# Patient Record
Sex: FEMALE | Race: WHITE | Employment: FULL TIME | URBAN - METROPOLITAN AREA
[De-identification: names, ages, dates, MRNs, and addresses within clinical notes are randomized per-mention and may not be internally consistent; named-entity substitution may affect disease eponyms.]

---

## 2017-02-28 ENCOUNTER — TELEPHONE (OUTPATIENT)
Dept: BARIATRICS/WEIGHT MGMT | Age: 62
End: 2017-02-28

## 2017-03-06 ENCOUNTER — OFFICE VISIT (OUTPATIENT)
Dept: BARIATRICS/WEIGHT MGMT | Age: 62
End: 2017-03-06

## 2017-03-06 VITALS
DIASTOLIC BLOOD PRESSURE: 88 MMHG | RESPIRATION RATE: 16 BRPM | SYSTOLIC BLOOD PRESSURE: 140 MMHG | WEIGHT: 239.5 LBS | HEIGHT: 62 IN | HEART RATE: 76 BPM | BODY MASS INDEX: 44.07 KG/M2

## 2017-03-06 DIAGNOSIS — K21.9 GASTROESOPHAGEAL REFLUX DISEASE WITHOUT ESOPHAGITIS: ICD-10-CM

## 2017-03-06 DIAGNOSIS — E66.01 MORBID OBESITY WITH BMI OF 40.0-44.9, ADULT (HCC): Primary | ICD-10-CM

## 2017-03-06 PROCEDURE — 99203 OFFICE O/P NEW LOW 30 MIN: CPT | Performed by: FAMILY MEDICINE

## 2017-03-06 RX ORDER — M-VIT,TX,IRON,MINS/CALC/FOLIC 27MG-0.4MG
1 TABLET ORAL DAILY
COMMUNITY

## 2017-03-06 RX ORDER — OMEPRAZOLE 40 MG/1
40 CAPSULE, DELAYED RELEASE ORAL DAILY
COMMUNITY

## 2017-03-06 RX ORDER — BUPROPION HYDROCHLORIDE 300 MG/1
300 TABLET ORAL DAILY
COMMUNITY
Start: 2016-12-22

## 2017-03-06 RX ORDER — ERGOCALCIFEROL 1.25 MG/1
CAPSULE ORAL
COMMUNITY
Start: 2017-01-23

## 2017-03-06 RX ORDER — DULAGLUTIDE 0.75 MG/.5ML
INJECTION, SOLUTION SUBCUTANEOUS WEEKLY
COMMUNITY
Start: 2017-02-20 | End: 2018-07-10

## 2017-03-06 RX ORDER — LEVOMEFOLATE/ALGAL OIL 15-90.314
CAPSULE ORAL
COMMUNITY
Start: 2017-01-12

## 2017-03-06 RX ORDER — AMLODIPINE BESYLATE 5 MG/1
TABLET ORAL
COMMUNITY
Start: 2017-01-16

## 2017-03-06 RX ORDER — DESVENLAFAXINE SUCCINATE 100 MG/1
100 TABLET, EXTENDED RELEASE ORAL DAILY
COMMUNITY
Start: 2016-12-22

## 2017-03-06 RX ORDER — ERYTHROMYCIN 250 MG/1
TABLET, DELAYED RELEASE ORAL
COMMUNITY
Start: 2017-01-27

## 2017-03-06 RX ORDER — GABAPENTIN 600 MG/1
900 TABLET ORAL DAILY
COMMUNITY
Start: 2017-02-20 | End: 2017-03-06

## 2017-03-06 ASSESSMENT — ENCOUNTER SYMPTOMS
EYES NEGATIVE: 1
GASTROINTESTINAL NEGATIVE: 1
RESPIRATORY NEGATIVE: 1

## 2017-03-20 ENCOUNTER — TELEPHONE (OUTPATIENT)
Dept: BARIATRICS/WEIGHT MGMT | Age: 62
End: 2017-03-20

## 2017-03-20 DIAGNOSIS — E78.2 MIXED HYPERLIPIDEMIA: Primary | ICD-10-CM

## 2017-03-25 DIAGNOSIS — E66.01 MORBID OBESITY WITH BMI OF 40.0-44.9, ADULT (HCC): ICD-10-CM

## 2017-03-25 LAB
A/G RATIO: 1.5 (ref 1.1–2.2)
ALBUMIN SERPL-MCNC: 3.9 G/DL (ref 3.4–5)
ALP BLD-CCNC: 75 U/L (ref 40–129)
ALT SERPL-CCNC: 28 U/L (ref 10–40)
ANION GAP SERPL CALCULATED.3IONS-SCNC: 14 MMOL/L (ref 3–16)
AST SERPL-CCNC: 18 U/L (ref 15–37)
BILIRUB SERPL-MCNC: 0.5 MG/DL (ref 0–1)
BUN BLDV-MCNC: 17 MG/DL (ref 7–20)
CALCIUM SERPL-MCNC: 9.5 MG/DL (ref 8.3–10.6)
CHLORIDE BLD-SCNC: 104 MMOL/L (ref 99–110)
CO2: 25 MMOL/L (ref 21–32)
CREAT SERPL-MCNC: 0.6 MG/DL (ref 0.6–1.2)
GFR AFRICAN AMERICAN: >60
GFR NON-AFRICAN AMERICAN: >60
GLOBULIN: 2.6 G/DL
GLUCOSE BLD-MCNC: 123 MG/DL (ref 70–99)
POTASSIUM SERPL-SCNC: 4.7 MMOL/L (ref 3.5–5.1)
SODIUM BLD-SCNC: 143 MMOL/L (ref 136–145)
TOTAL PROTEIN: 6.5 G/DL (ref 6.4–8.2)

## 2017-03-26 LAB
ESTIMATED AVERAGE GLUCOSE: 119.8 MG/DL
HBA1C MFR BLD: 5.8 %

## 2017-03-27 ENCOUNTER — TELEPHONE (OUTPATIENT)
Dept: BARIATRICS/WEIGHT MGMT | Age: 62
End: 2017-03-27

## 2017-03-27 DIAGNOSIS — R73.03 PREDIABETES: Primary | ICD-10-CM

## 2017-03-30 ENCOUNTER — OFFICE VISIT (OUTPATIENT)
Dept: BARIATRICS/WEIGHT MGMT | Age: 62
End: 2017-03-30

## 2017-03-30 VITALS
HEIGHT: 62 IN | WEIGHT: 229.5 LBS | HEART RATE: 88 BPM | SYSTOLIC BLOOD PRESSURE: 136 MMHG | DIASTOLIC BLOOD PRESSURE: 82 MMHG | BODY MASS INDEX: 42.23 KG/M2

## 2017-03-30 DIAGNOSIS — E78.2 MIXED HYPERLIPIDEMIA: ICD-10-CM

## 2017-03-30 DIAGNOSIS — R73.03 PREDIABETES: ICD-10-CM

## 2017-03-30 DIAGNOSIS — E66.01 MORBID OBESITY WITH BMI OF 40.0-44.9, ADULT (HCC): Primary | ICD-10-CM

## 2017-03-30 PROCEDURE — 99213 OFFICE O/P EST LOW 20 MIN: CPT | Performed by: FAMILY MEDICINE

## 2017-03-30 ASSESSMENT — ENCOUNTER SYMPTOMS
GASTROINTESTINAL NEGATIVE: 1
RESPIRATORY NEGATIVE: 1
EYES NEGATIVE: 1

## 2017-05-01 ENCOUNTER — OFFICE VISIT (OUTPATIENT)
Dept: BARIATRICS/WEIGHT MGMT | Age: 62
End: 2017-05-01

## 2017-05-01 VITALS
SYSTOLIC BLOOD PRESSURE: 122 MMHG | DIASTOLIC BLOOD PRESSURE: 80 MMHG | WEIGHT: 221 LBS | HEART RATE: 64 BPM | BODY MASS INDEX: 40.67 KG/M2 | HEIGHT: 62 IN

## 2017-05-01 DIAGNOSIS — E66.01 MORBID OBESITY WITH BMI OF 40.0-44.9, ADULT (HCC): Primary | ICD-10-CM

## 2017-05-01 DIAGNOSIS — Z71.3 DIETARY COUNSELING AND SURVEILLANCE: ICD-10-CM

## 2017-05-01 PROCEDURE — 99213 OFFICE O/P EST LOW 20 MIN: CPT | Performed by: FAMILY MEDICINE

## 2017-05-01 RX ORDER — NAPROXEN 250 MG/1
250 TABLET ORAL 2 TIMES DAILY WITH MEALS
COMMUNITY
End: 2018-07-10

## 2017-05-01 ASSESSMENT — ENCOUNTER SYMPTOMS
EYES NEGATIVE: 1
GASTROINTESTINAL NEGATIVE: 1
RESPIRATORY NEGATIVE: 1

## 2017-06-13 ENCOUNTER — OFFICE VISIT (OUTPATIENT)
Dept: BARIATRICS/WEIGHT MGMT | Age: 62
End: 2017-06-13

## 2017-06-13 VITALS
SYSTOLIC BLOOD PRESSURE: 112 MMHG | HEART RATE: 72 BPM | HEIGHT: 62 IN | DIASTOLIC BLOOD PRESSURE: 70 MMHG | BODY MASS INDEX: 39.01 KG/M2 | WEIGHT: 212 LBS

## 2017-06-13 DIAGNOSIS — E66.9 CLASS 2 OBESITY: Primary | ICD-10-CM

## 2017-06-13 PROCEDURE — 99213 OFFICE O/P EST LOW 20 MIN: CPT | Performed by: FAMILY MEDICINE

## 2017-06-13 ASSESSMENT — ENCOUNTER SYMPTOMS
GASTROINTESTINAL NEGATIVE: 1
EYES NEGATIVE: 1
RESPIRATORY NEGATIVE: 1

## 2017-06-16 LAB
HPV COMMENT: NORMAL
HPV TYPE 16: NOT DETECTED
HPV TYPE 18: NOT DETECTED
HPVOH (OTHER TYPES): NOT DETECTED

## 2017-06-21 ENCOUNTER — HOSPITAL ENCOUNTER (OUTPATIENT)
Dept: ENDOSCOPY | Age: 62
Discharge: HOME | End: 2017-06-21
Attending: INTERNAL MEDICINE | Admitting: INTERNAL MEDICINE

## 2017-06-21 VITALS
TEMPERATURE: 98.6 F | BODY MASS INDEX: 38.28 KG/M2 | HEIGHT: 62 IN | SYSTOLIC BLOOD PRESSURE: 132 MMHG | RESPIRATION RATE: 16 BRPM | WEIGHT: 208 LBS | DIASTOLIC BLOOD PRESSURE: 69 MMHG | HEART RATE: 70 BPM | OXYGEN SATURATION: 96 %

## 2017-06-21 LAB
GLUCOSE BLD-MCNC: 127 MG/DL (ref 70–99)
PERFORMED ON: ABNORMAL

## 2017-06-21 RX ORDER — FROVATRIPTAN SUCCINATE 2.5 MG/1
2.5 TABLET, FILM COATED ORAL PRN
COMMUNITY

## 2017-07-19 ENCOUNTER — OFFICE VISIT (OUTPATIENT)
Dept: BARIATRICS/WEIGHT MGMT | Age: 62
End: 2017-07-19

## 2017-07-19 VITALS
HEART RATE: 64 BPM | WEIGHT: 206.5 LBS | DIASTOLIC BLOOD PRESSURE: 84 MMHG | HEIGHT: 62 IN | BODY MASS INDEX: 38 KG/M2 | SYSTOLIC BLOOD PRESSURE: 128 MMHG

## 2017-07-19 DIAGNOSIS — Z71.3 DIETARY COUNSELING AND SURVEILLANCE: ICD-10-CM

## 2017-07-19 DIAGNOSIS — E66.9 CLASS 2 OBESITY: Primary | ICD-10-CM

## 2017-07-19 DIAGNOSIS — F32.A DEPRESSION, UNSPECIFIED DEPRESSION TYPE: ICD-10-CM

## 2017-07-19 DIAGNOSIS — I10 ESSENTIAL HYPERTENSION: ICD-10-CM

## 2017-07-19 PROCEDURE — 99214 OFFICE O/P EST MOD 30 MIN: CPT | Performed by: FAMILY MEDICINE

## 2017-07-19 ASSESSMENT — ENCOUNTER SYMPTOMS
EYES NEGATIVE: 1
RESPIRATORY NEGATIVE: 1
GASTROINTESTINAL NEGATIVE: 1

## 2017-08-14 ENCOUNTER — OFFICE VISIT (OUTPATIENT)
Dept: BARIATRICS/WEIGHT MGMT | Age: 62
End: 2017-08-14

## 2017-08-14 VITALS
BODY MASS INDEX: 37.81 KG/M2 | DIASTOLIC BLOOD PRESSURE: 82 MMHG | WEIGHT: 205.5 LBS | SYSTOLIC BLOOD PRESSURE: 132 MMHG | HEIGHT: 62 IN | HEART RATE: 72 BPM

## 2017-08-14 DIAGNOSIS — E66.9 CLASS 2 OBESITY: Primary | ICD-10-CM

## 2017-08-14 DIAGNOSIS — Z71.3 DIETARY COUNSELING AND SURVEILLANCE: ICD-10-CM

## 2017-08-14 PROCEDURE — 99213 OFFICE O/P EST LOW 20 MIN: CPT | Performed by: FAMILY MEDICINE

## 2017-08-14 ASSESSMENT — ENCOUNTER SYMPTOMS
GASTROINTESTINAL NEGATIVE: 1
RESPIRATORY NEGATIVE: 1
EYES NEGATIVE: 1

## 2017-09-18 ENCOUNTER — OFFICE VISIT (OUTPATIENT)
Dept: BARIATRICS/WEIGHT MGMT | Age: 62
End: 2017-09-18

## 2017-09-18 VITALS
SYSTOLIC BLOOD PRESSURE: 130 MMHG | WEIGHT: 200 LBS | DIASTOLIC BLOOD PRESSURE: 80 MMHG | HEIGHT: 62 IN | BODY MASS INDEX: 36.8 KG/M2 | HEART RATE: 64 BPM

## 2017-09-18 DIAGNOSIS — E66.9 CLASS 2 OBESITY: Primary | ICD-10-CM

## 2017-09-18 DIAGNOSIS — Z71.3 DIETARY COUNSELING AND SURVEILLANCE: ICD-10-CM

## 2017-09-18 PROCEDURE — 99213 OFFICE O/P EST LOW 20 MIN: CPT | Performed by: FAMILY MEDICINE

## 2017-09-19 ENCOUNTER — TELEPHONE (OUTPATIENT)
Dept: BARIATRICS/WEIGHT MGMT | Age: 62
End: 2017-09-19

## 2017-09-19 ASSESSMENT — ENCOUNTER SYMPTOMS
GASTROINTESTINAL NEGATIVE: 1
RESPIRATORY NEGATIVE: 1
EYES NEGATIVE: 1

## 2017-10-23 ENCOUNTER — OFFICE VISIT (OUTPATIENT)
Dept: BARIATRICS/WEIGHT MGMT | Age: 62
End: 2017-10-23

## 2017-10-23 VITALS
BODY MASS INDEX: 36.53 KG/M2 | WEIGHT: 198.5 LBS | DIASTOLIC BLOOD PRESSURE: 80 MMHG | HEIGHT: 62 IN | SYSTOLIC BLOOD PRESSURE: 136 MMHG | HEART RATE: 60 BPM

## 2017-10-23 DIAGNOSIS — E66.9 CLASS 2 OBESITY: Primary | ICD-10-CM

## 2017-10-23 DIAGNOSIS — Z71.3 DIETARY COUNSELING AND SURVEILLANCE: ICD-10-CM

## 2017-10-23 PROCEDURE — 99213 OFFICE O/P EST LOW 20 MIN: CPT | Performed by: FAMILY MEDICINE

## 2017-10-23 NOTE — PROGRESS NOTES
Bhumi CALIXTO Cheng lost 1.5 lbs over past month. She is using 3 products/day     Current treatment plan:   Patient is not on medication. Negative side effects from medications? N/A  Patient is  on Optifast.   Patient is not on diet and exercise only plan. 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 Consuming at least 64oz of calorie free fluids? Yes    Participating in intentional exercise?  Reports her pain level interferes- doing acupuncture     Plan/Goals: increase exercise to 3 x week     Handouts: none     Miguel Murdock

## 2017-10-23 NOTE — PATIENT INSTRUCTIONS
changes in your eating habits. Instead of a diet, focus on lifestyle changes that will improve your health and achieve the right balance of energy and calories. To lose weight, you need to burn more calories than you take in. You can do it by eating healthy foods in reasonable amounts and becoming more active, even a little bit every day. Making small changes over time can add up to a lot. Make a plan for change. Many people have found that naming their reasons for change and staying focused on their plan can make a big difference. Work with your doctor to create a plan that is right for you. · Ask yourself: Renny Sanchez are my personal, most powerful reasons for wanting this change? What will my life look like when I've made the change? \"  · Set your long-term goal. Make it specific, such as \"I will lose x pounds. \"  · Break your long-term goal into smaller, short-term goals. Make these small steps specific and within your reach, things you know you can do. These steps are what keep you going from day to day. How can you stay on your plan for change? Be ready. Choose to start during a time when there are few events that might trigger slip-ups, like holidays, social events, and high-stress periods. Decide on your first few steps. Most people have more success when they make small changes, one step at a time. For example, you might switch a daily candy bar to a piece of fruit, walk 10 minutes more, or add more vegetables to a meal.  Line up your support people. Make sure you're not going to be alone as you make this change. Connect with people who understand how important it is to you. Ask family members and friends for help in keeping with your plan. And think about who could make it harder for you, and how to handle them. Try tracking. People who keep track of what they eat, feel, and do are better at losing weight. Try writing down things like:  · What and how much you eat.   · How you feel before and after each

## 2017-10-24 ASSESSMENT — ENCOUNTER SYMPTOMS
RESPIRATORY NEGATIVE: 1
EYES NEGATIVE: 1
GASTROINTESTINAL NEGATIVE: 1

## 2017-10-24 NOTE — PROGRESS NOTES
Patient: Greg Leonard                      Encounter Date: 10/23/2017    YOB: 1955               Age: 65 y.o. Chief Complaint   Patient presents with    Weight Management     8th MWM, Optifast       /80   Pulse 60   Ht 5' 1.5\" (1.562 m)   Wt 198 lb 8 oz (90 kg)   BMI 36.90 kg/m²     Body mass index is 36.9 kg/m². Waist Circumference  Waist (Inches): 44 in    HPI:  65 y.o. female with a long-standing history of obesity presents today for follow-up. She has lost 1.5 pound since her last visit on 9/19 . Current treatment includes Optifast 1200-calorie plan with 1 HP MR and 2 regular MR diet. Met with the dietitian today. Assessment reviewed. Overall, making good dietary choices and sticking to the meal plan. She has been stressed due to 's recent cancer diagnosis. She's trying her best to stay focused and not stress eat. Diet: []LCHF/Ketogenic   []Modified low-calorie/low carb diet  [x]Low-calorie diet          []Maintenance       []Other:         Adherent?  [x]Yes     []No       Side effects: No          Exercise: []Cardio     []Resistance/strength training     [x]Other: No intentional exercise, but trying to be physically active     Allergies   Allergen Reactions    Seasonal          Current Outpatient Prescriptions:     frovatriptan succinate (FROVA) 2.5 MG TABS, Take 2.5 mg by mouth as needed for Migraine, Disp: , Rfl:     MELOXICAM PO, Take by mouth daily, Disp: , Rfl:     Cholecalciferol (VITAMIN D PO), Take by mouth daily, Disp: , Rfl:     DOXYCYCLINE PO, Take by mouth daily, Disp: , Rfl:     vitamin B-2 (RIBOFLAVIN) 100 MG TABS tablet, TK 4 TS PO QD, Disp: , Rfl: 11    naproxen (NAPROSYN) 250 MG tablet, Take 250 mg by mouth 2 times daily (with meals), Disp: , Rfl:     amLODIPine (NORVASC) 5 MG tablet, , Disp: , Rfl:     buPROPion (WELLBUTRIN XL) 300 MG extended release tablet, Take 300 mg by mouth daily, Disp: , Rfl:     PRISTIQ 100 MG TB24 extended release tablet, Take 100 mg by mouth daily, Disp: , Rfl:     MIRANDA- MG EC tablet, , Disp: , Rfl:     MAGNESIUM-OXIDE 400 (241.3 MG) MG TABS tablet, TK 1 T PO D, Disp: , Rfl: 0    vitamin D (ERGOCALCIFEROL) 70062 UNITS CAPS capsule, , Disp: , Rfl:     TRULICITY 1.98 KW/6.2VZ SOPN, Inject into the skin once a week , Disp: , Rfl:     L-Methylfolate-Algae (DEPLIN 15) 15-90.314 MG CAPS, , Disp: , Rfl:     Fluticasone Propionate (FLONASE NA), by Nasal route, Disp: , Rfl:     omeprazole (PRILOSEC) 40 MG delayed release capsule, Take 40 mg by mouth daily, Disp: , Rfl:     Multiple Vitamins-Minerals (THERAPEUTIC MULTIVITAMIN-MINERALS) tablet, Take 1 tablet by mouth daily, Disp: , Rfl:     aspirin 81 MG tablet, Take 81 mg by mouth daily, Disp: , Rfl:     albuterol (PROVENTIL HFA;VENTOLIN HFA) 108 (90 BASE) MCG/ACT inhaler, Inhale 2 puffs into the lungs every 6 hours as needed. , Disp: , Rfl:     gabapentin (NEURONTIN) 300 MG capsule, Take 600 mg by mouth 3 times daily. , Disp: , Rfl:     metoprolol (TOPROL XL) 100 MG XL tablet, Take 100 mg by mouth daily. , Disp: , Rfl:     progesterone (PROMETRIUM) 100 MG capsule, Take 100 mg by mouth daily. , Disp: , Rfl:     estradiol (VIVELLE) 0.05 MG/24HR, Place 1 patch onto the skin Twice a Week , Disp: , Rfl:     LORazepam (ATIVAN) 0.5 MG tablet, Take 0.5 mg by mouth every 6 hours as needed. , Disp: , Rfl:     Patient Active Problem List   Diagnosis    Obesity    Osteoarthritis    Hyperlipidemia    Hypertension    Sleep apnea    Asthma    GERD (gastroesophageal reflux disease)    Mixed hyperlipidemia    Prediabetes       Review of Systems   HENT: Negative. Eyes: Negative. Respiratory: Negative. Cardiovascular: Negative. Gastrointestinal: Negative. Endocrine: Negative. Musculoskeletal: Negative. Neurological: Negative. Psychiatric/Behavioral: Negative. Physical Exam   Constitutional: She is oriented to person, place, and time.  She appears well-developed and well-nourished. Cardiovascular: Normal rate, regular rhythm and normal heart sounds. Exam reveals no gallop and no friction rub. No murmur heard. Pulmonary/Chest: Effort normal and breath sounds normal. No respiratory distress. She has no wheezes. She has no rales. Musculoskeletal: She exhibits no edema. Neurological: She is alert and oriented to person, place, and time. Skin: Skin is warm and dry. Psychiatric: She has a normal mood and affect. Her behavior is normal. Judgment and thought content normal.       Admission on 09/19/2017, Discharged on 09/19/2017   Component Date Value Ref Range Status    WBC 09/19/2017 12.2* 4.0 - 11.0 K/uL Final    RBC 09/19/2017 5.09  4.00 - 5.20 M/uL Final    Hemoglobin 09/19/2017 15.0  12.0 - 16.0 g/dL Final    Hematocrit 09/19/2017 44.3  36.0 - 48.0 % Final    MCV 09/19/2017 87.0  80.0 - 100.0 fL Final    MCH 09/19/2017 29.4  26.0 - 34.0 pg Final    MCHC 09/19/2017 33.7  31.0 - 36.0 g/dL Final    RDW 09/19/2017 13.8  12.4 - 15.4 % Final    Platelets 27/08/7478 284  135 - 450 K/uL Final    MPV 09/19/2017 8.0  5.0 - 10.5 fL Final    Sodium 09/19/2017 141  136 - 145 mmol/L Final    Potassium 09/19/2017 4.5  3.5 - 5.1 mmol/L Final    Chloride 09/19/2017 102  99 - 110 mmol/L Final    CO2 09/19/2017 25  21 - 32 mmol/L Final    Anion Gap 09/19/2017 14  3 - 16 Final    Glucose 09/19/2017 109* 70 - 99 mg/dL Final    BUN 09/19/2017 25* 7 - 20 mg/dL Final    CREATININE 09/19/2017 0.6  0.6 - 1.2 mg/dL Final    GFR Non- 09/19/2017 >60  >60 Final    Comment: >60 mL/min/1.73m2 EGFR, calc. for ages 25 and older using the  MDRD formula (not corrected for weight), is valid for stable  renal function.  GFR  09/19/2017 >60  >60 Final    Comment: Chronic Kidney Disease: less than 60 ml/min/1.73 sq.m. Kidney Failure: less than 15 ml/min/1.73 sq.m. Results valid for patients 18 years and older. ST and T wave abnormality  Abnormal ECG    Confirmed by [facility] - ANYI DELVALLE MD ([id number]) on 9/20/2017 7:13:54 AM      Urine Culture, Routine 09/21/2017 <10,000 CFU/ml mixed skin/urogenital yarelis. No further workup*  Final    Organism 09/21/2017 [facility] Group B (Strep agalacticae)*  Final    Urine Culture, Routine 09/21/2017    Final                    Value:25,000 CFU/ml  Susceptibility testing of penicillin and other beta-lactams is  not necessary for beta hemolytic Streptococci since resistant  strains have not been identified. (CLSI G076-H94, 2017)      WBC, UA 09/19/2017 3-5  0 - 5 /HPF Final    RBC, UA 09/19/2017 0-2  0 - 2 /HPF Final    Epi Cells 09/19/2017 10-20  /HPF Final    Bacteria, UA 09/19/2017 Rare* /HPF Final         Assessment and Plan:    ICD-10-CM ICD-9-CM    1. Class 2 obesity E66.9 278.00 Improving. Continue current management. Follow-up in 4 weeks. Vitamin D 25 Hydroxy      Vitamin B12 & Folate   2.  Dietary counseling and surveillance Z71.3 V65.3        Nutrition plan: [] LCHF/Ketogenic   [] Modified low-calorie diet (low carb/low-surinder)               [x] Low-calorie diet    []Maintenance       []Other    Exercise: [x]Cardio     []Resistance/strength training                       [x]ACSM recommendations (150 minutes/week in active weight loss)                              Behavior: [x]Motivational interviewing performed    [] Referral for counseling                         [x]Discussed strategies to overcome habits/challenges for focus         [x] Stress management   [] Stimulus control    Reviewed:  [x] Nutrition and the importance of regular protein intake  [x] Hidden carbohydrate sources  [x] Alcohol use  [x] Tobacco use   [x] Importance of exercise and reducing sedentary time  [x] Proper use of OPTIFAST and side effects   [x] Labs- labs ordered      Treatment start date: 3/31/17  Transition start date: 8/14/17      Orders Placed This Encounter   Procedures    Vitamin D 25 Hydroxy     Standing Status:   Future     Standing Expiration Date:   10/23/2018    Vitamin B12 & Folate     Standing Status:   Future     Standing Expiration Date:   10/23/2018       No Follow-up on file. This dictation was performed with a verbal recognition program (Dragon) and all efforts were made to ensure accuracy of this dictation. It is possible that there are still dictated errors within this note. If so, please bring any errors to my attention for correction.

## 2017-12-18 DIAGNOSIS — E66.9 CLASS 2 OBESITY: ICD-10-CM

## 2017-12-18 LAB
FOLATE: >20 NG/ML (ref 4.78–24.2)
VITAMIN B-12: 1784 PG/ML (ref 211–911)
VITAMIN D 25-HYDROXY: 35.3 NG/ML

## 2017-12-21 ENCOUNTER — OFFICE VISIT (OUTPATIENT)
Dept: BARIATRICS/WEIGHT MGMT | Age: 62
End: 2017-12-21

## 2017-12-21 VITALS
DIASTOLIC BLOOD PRESSURE: 82 MMHG | WEIGHT: 201.5 LBS | BODY MASS INDEX: 37.08 KG/M2 | SYSTOLIC BLOOD PRESSURE: 134 MMHG | HEART RATE: 60 BPM | HEIGHT: 62 IN

## 2017-12-21 DIAGNOSIS — Z71.3 DIETARY COUNSELING AND SURVEILLANCE: ICD-10-CM

## 2017-12-21 DIAGNOSIS — E66.9 CLASS 2 OBESITY: Primary | ICD-10-CM

## 2017-12-21 PROCEDURE — 99213 OFFICE O/P EST LOW 20 MIN: CPT | Performed by: FAMILY MEDICINE

## 2017-12-21 ASSESSMENT — ENCOUNTER SYMPTOMS
RESPIRATORY NEGATIVE: 1
EYES NEGATIVE: 1
GASTROINTESTINAL NEGATIVE: 1

## 2017-12-21 NOTE — PROGRESS NOTES
Bhumi CALIXTO Cheng gained 3 lbs over past 2 months. Pt did go off optifast when  was on hold for surgery. She reports eating some fruit & popcorn (small snack bag 130 surinder). Reports grazing at night and having occasional cookie. Current treatment plan:   Patient is not on medication. Negative side effects from medications? no  Patient is on Optifast.   Patient is not on diet and exercise only plan. Treatment plan details:  Optifast 3:2 (1 HP / 2 Reg)    Is patient adhering to diet/meal plan?: no    Carbohydrate consumption: 60-70 gm     Breakfast: optifast shake w/ coffee    Snack: none    Lunch: lean cuisine    Snack: fruit (orange OR apple) sometimes veggies    Snack: optifast bar    Dinner: protein (grilled or baked chicken or fish), veggie     Snack: salad    Snack: popcorn (snack bag)    Snack: optifast bar    Consuming at least 64oz of calorie free fluids? Yes - mostly plain water, coffee w/ carbmaster OR coffee (half n half w/ skim milk 20 oz)    Participating in intentional exercise? Yes - currently while off work but overall during work week it is hit or miss    Plan/Goals:   1. Tighten up plan stick to 3:2, watch grazing  2. Consistent exercise  3.   Watch snacks - cookies / popcorn    Handouts: none    [employer]

## 2017-12-21 NOTE — PROGRESS NOTES
Patient: Rosalind Eye                      Encounter Date: 12/21/2017    YOB: 1955               Age: 65 y.o. Chief Complaint   Patient presents with    Weight Management     9th MWM, Optifast       /82   Pulse 60   Ht 5' 1.5\" (1.562 m)   Wt 201 lb 8 oz (91.4 kg)   BMI 37.46 kg/m²     Body mass index is 37.46 kg/m². HPI: 65 y.o. female with a long-standing history of obesity presents today for follow-up. She's gained 3 pounds since her last visit in October. Current treatment includes Optifast 1200-Calorie plan with 1 HP MR and 2 regular MR + 2 home meals. Got off track around the holidays. Hasn't been sticking to the meal plan. Met with the dietitian today. Food recall and assessment reviewed. Labs 12/18 reviewed. Diet: []LCHF/Ketogenic   []Modified low-calorie/low carb diet  [x]Low-calorie diet          []Maintenance       []Other:         Adherent?  []Yes     [x]No              Exercise: []Cardio     []Resistance/strength training     [x]Other: No intentional exercise, but trying to be physically active     Allergies   Allergen Reactions    Seasonal          Current Outpatient Prescriptions:     frovatriptan succinate (FROVA) 2.5 MG TABS, Take 2.5 mg by mouth as needed for Migraine, Disp: , Rfl:     MELOXICAM PO, Take by mouth daily, Disp: , Rfl:     Cholecalciferol (VITAMIN D PO), Take by mouth daily, Disp: , Rfl:     DOXYCYCLINE PO, Take by mouth daily, Disp: , Rfl:     vitamin B-2 (RIBOFLAVIN) 100 MG TABS tablet, TK 4 TS PO QD, Disp: , Rfl: 11    naproxen (NAPROSYN) 250 MG tablet, Take 250 mg by mouth 2 times daily (with meals), Disp: , Rfl:     amLODIPine (NORVASC) 5 MG tablet, , Disp: , Rfl:     buPROPion (WELLBUTRIN XL) 300 MG extended release tablet, Take 300 mg by mouth daily, Disp: , Rfl:     PRISTIQ 100 MG TB24 extended release tablet, Take 100 mg by mouth daily, Disp: , Rfl:     MIRANDA- MG EC tablet, , Disp: , Rfl:     MAGNESIUM-OXIDE 400 (241.3 Cardiovascular: Normal rate, regular rhythm and normal heart sounds. Exam reveals no gallop and no friction rub. No murmur heard. Pulmonary/Chest: Effort normal and breath sounds normal. No respiratory distress. She has no wheezes. She has no rales. Abdominal: Soft. There is no tenderness. Musculoskeletal: She exhibits no edema. Neurological: She is alert and oriented to person, place, and time. Skin: Skin is warm and dry. Psychiatric: She has a normal mood and affect. Her behavior is normal. Judgment and thought content normal.       Orders Only on 12/18/2017   Component Date Value Ref Range Status    Vit D, 25-Hydroxy 12/18/2017 35.3  >=30 ng/mL Final    Comment: <=20 ng/mL. ........... Cleven Ewings Deficient  21-29 ng/mL. ......... Cleven Ewings Insufficient  >=30 ng/mL. ........ Cleven Ewings Sufficient      Vitamin B-12 12/18/2017 1784* 211 - 911 pg/mL Final    Folate 12/18/2017 >20.00  4.78 - 24.20 ng/mL Final    Comment: Effective 11-15-16 10:00am EST  Please note reference ranges have  changed for Folate. Assessment and Plan:    ICD-10-CM ICD-9-CM    1. Class 2 obesity E66.9 278.00 Encouraged patient to start following the recommended meal plan. Also discussed switching over to the 1200-Calorie, low carb meal plan with Optifast meal replacements as needed for 12 meals. She would like to stick to the current meal plan until after the holidays. 2. BMI 37.0-37.9, adult Z68.37 V85.37    3. Dietary counseling and surveillance Z71.3 V65.3 Greater than 50% of this 15 minute visit was used in direct counseling.        Nutrition plan: [] LCHF/Ketogenic   [] Modified low-calorie diet (low carb/low-surinder)               [x] Low-calorie diet    []Maintenance       []Other    Exercise: []Cardio     []Resistance/strength training                       [x]ACSM recommendations (150 minutes/week in active weight loss)                              Behavior: [x]Motivational interviewing performed                        [x] Recommend support groups                          [x]Discussed strategies to overcome habits/challenges for focus         [] Stress management   [x] Stimulus control                    [] Sleep hygiene    Reviewed:  [x] Nutrition and the importance of regular protein intake  [x] Hidden carbohydrate sources  [x] Alcohol use  [x] Tobacco use   [x] Importance of exercise and reducing sedentary time  [x] Proper use of OPTIFAST and side effects   [x] Labs     Treatment start date: 3/31/17  Transition start date: 8/14/17  Total weight loss: 38 pounds      No orders of the defined types were placed in this encounter. No Follow-up on file. This dictation was performed with a verbal recognition program (Dragon) and all efforts were made to ensure accuracy of this dictation. It is possible that there are still dictated errors within this note. If so, please bring any errors to my attention for correction.

## 2018-03-14 ENCOUNTER — HOSPITAL ENCOUNTER (OUTPATIENT)
Dept: GENERAL RADIOLOGY | Age: 63
Discharge: HOME | End: 2018-03-14
Attending: INTERNAL MEDICINE | Admitting: INTERNAL MEDICINE

## 2018-03-14 DIAGNOSIS — C50.412 MALIGNANT NEOPLASM OF UPPER-OUTER QUADRANT OF LEFT FEMALE BREAST, UNSPECIFIED ESTROGEN RECEPTOR STATUS (HCC): ICD-10-CM

## 2018-06-06 ENCOUNTER — OFFICE VISIT (OUTPATIENT)
Dept: BARIATRICS/WEIGHT MGMT | Age: 63
End: 2018-06-06

## 2018-06-06 VITALS
DIASTOLIC BLOOD PRESSURE: 82 MMHG | HEIGHT: 62 IN | HEART RATE: 64 BPM | SYSTOLIC BLOOD PRESSURE: 136 MMHG | BODY MASS INDEX: 39.93 KG/M2 | WEIGHT: 217 LBS

## 2018-06-06 DIAGNOSIS — E78.5 HYPERLIPIDEMIA, UNSPECIFIED HYPERLIPIDEMIA TYPE: ICD-10-CM

## 2018-06-06 DIAGNOSIS — Z71.3 DIETARY COUNSELING AND SURVEILLANCE: ICD-10-CM

## 2018-06-06 DIAGNOSIS — I10 HTN (HYPERTENSION), BENIGN: ICD-10-CM

## 2018-06-06 DIAGNOSIS — R73.03 PREDIABETES: ICD-10-CM

## 2018-06-06 DIAGNOSIS — E66.01 MORBID OBESITY WITH BMI OF 40.0-44.9, ADULT (HCC): ICD-10-CM

## 2018-06-06 DIAGNOSIS — E66.01 MORBID OBESITY WITH BMI OF 40.0-44.9, ADULT (HCC): Primary | ICD-10-CM

## 2018-06-06 LAB
A/G RATIO: 1.6 (ref 1.1–2.2)
ALBUMIN SERPL-MCNC: 4.1 G/DL (ref 3.4–5)
ALP BLD-CCNC: 88 U/L (ref 40–129)
ALT SERPL-CCNC: 17 U/L (ref 10–40)
ANION GAP SERPL CALCULATED.3IONS-SCNC: 14 MMOL/L (ref 3–16)
AST SERPL-CCNC: 13 U/L (ref 15–37)
BASOPHILS ABSOLUTE: 0 K/UL (ref 0–0.2)
BASOPHILS RELATIVE PERCENT: 0.6 %
BILIRUB SERPL-MCNC: 0.4 MG/DL (ref 0–1)
BUN BLDV-MCNC: 19 MG/DL (ref 7–20)
CALCIUM SERPL-MCNC: 9.6 MG/DL (ref 8.3–10.6)
CHLORIDE BLD-SCNC: 104 MMOL/L (ref 99–110)
CHOLESTEROL, TOTAL: 241 MG/DL (ref 0–199)
CO2: 28 MMOL/L (ref 21–32)
CREAT SERPL-MCNC: <0.5 MG/DL (ref 0.6–1.2)
EOSINOPHILS ABSOLUTE: 0.2 K/UL (ref 0–0.6)
EOSINOPHILS RELATIVE PERCENT: 2.8 %
FOLATE: >20 NG/ML (ref 4.78–24.2)
GFR AFRICAN AMERICAN: >60
GFR NON-AFRICAN AMERICAN: >60
GLOBULIN: 2.6 G/DL
GLUCOSE BLD-MCNC: 147 MG/DL (ref 70–99)
HCT VFR BLD CALC: 42.2 % (ref 36–48)
HDLC SERPL-MCNC: 71 MG/DL (ref 40–60)
HEMOGLOBIN: 14.5 G/DL (ref 12–16)
LDL CHOLESTEROL CALCULATED: 138 MG/DL
LYMPHOCYTES ABSOLUTE: 1.2 K/UL (ref 1–5.1)
LYMPHOCYTES RELATIVE PERCENT: 19.3 %
MCH RBC QN AUTO: 30 PG (ref 26–34)
MCHC RBC AUTO-ENTMCNC: 34.3 G/DL (ref 31–36)
MCV RBC AUTO: 87.5 FL (ref 80–100)
MONOCYTES ABSOLUTE: 0.3 K/UL (ref 0–1.3)
MONOCYTES RELATIVE PERCENT: 5.2 %
NEUTROPHILS ABSOLUTE: 4.5 K/UL (ref 1.7–7.7)
NEUTROPHILS RELATIVE PERCENT: 72.1 %
PDW BLD-RTO: 13.6 % (ref 12.4–15.4)
PLATELET # BLD: 222 K/UL (ref 135–450)
PMV BLD AUTO: 7.7 FL (ref 5–10.5)
POTASSIUM SERPL-SCNC: 4.6 MMOL/L (ref 3.5–5.1)
RBC # BLD: 4.82 M/UL (ref 4–5.2)
SODIUM BLD-SCNC: 146 MMOL/L (ref 136–145)
TOTAL PROTEIN: 6.7 G/DL (ref 6.4–8.2)
TRIGL SERPL-MCNC: 158 MG/DL (ref 0–150)
TSH REFLEX: 1.17 UIU/ML (ref 0.27–4.2)
VITAMIN B-12: 1114 PG/ML (ref 211–911)
VITAMIN D 25-HYDROXY: 45.8 NG/ML
VLDLC SERPL CALC-MCNC: 32 MG/DL
WBC # BLD: 6.2 K/UL (ref 4–11)

## 2018-06-06 PROCEDURE — 99213 OFFICE O/P EST LOW 20 MIN: CPT | Performed by: FAMILY MEDICINE

## 2018-06-06 RX ORDER — NARATRIPTAN 1 MG/1
TABLET ORAL
COMMUNITY
Start: 2018-05-11

## 2018-06-06 RX ORDER — LETROZOLE 2.5 MG/1
2.5 TABLET, FILM COATED ORAL DAILY
COMMUNITY
Start: 2018-05-25

## 2018-06-06 ASSESSMENT — ENCOUNTER SYMPTOMS
GASTROINTESTINAL NEGATIVE: 1
EYES NEGATIVE: 1
RESPIRATORY NEGATIVE: 1

## 2018-06-07 LAB
ESTIMATED AVERAGE GLUCOSE: 131.2 MG/DL
HBA1C MFR BLD: 6.2 %

## 2018-06-11 ENCOUNTER — OFFICE VISIT (OUTPATIENT)
Dept: BARIATRICS/WEIGHT MGMT | Age: 63
End: 2018-06-11

## 2018-06-11 VITALS
BODY MASS INDEX: 39.93 KG/M2 | SYSTOLIC BLOOD PRESSURE: 136 MMHG | HEIGHT: 62 IN | DIASTOLIC BLOOD PRESSURE: 80 MMHG | WEIGHT: 217 LBS | HEART RATE: 64 BPM

## 2018-06-11 DIAGNOSIS — Z79.899 HIGH RISK MEDICATIONS (NOT ANTICOAGULANTS) LONG-TERM USE: ICD-10-CM

## 2018-06-11 DIAGNOSIS — I10 HTN (HYPERTENSION), BENIGN: ICD-10-CM

## 2018-06-11 DIAGNOSIS — E78.5 HYPERLIPIDEMIA, UNSPECIFIED HYPERLIPIDEMIA TYPE: ICD-10-CM

## 2018-06-11 DIAGNOSIS — E66.01 MORBID OBESITY WITH BMI OF 40.0-44.9, ADULT (HCC): Primary | ICD-10-CM

## 2018-06-11 DIAGNOSIS — Z71.3 DIETARY COUNSELING AND SURVEILLANCE: ICD-10-CM

## 2018-06-11 DIAGNOSIS — R73.03 PREDIABETES: ICD-10-CM

## 2018-06-11 PROCEDURE — 93000 ELECTROCARDIOGRAM COMPLETE: CPT | Performed by: FAMILY MEDICINE

## 2018-06-11 PROCEDURE — 99214 OFFICE O/P EST MOD 30 MIN: CPT | Performed by: FAMILY MEDICINE

## 2018-06-11 ASSESSMENT — ENCOUNTER SYMPTOMS
GASTROINTESTINAL NEGATIVE: 1
RESPIRATORY NEGATIVE: 1
EYES NEGATIVE: 1

## 2018-07-10 ENCOUNTER — OFFICE VISIT (OUTPATIENT)
Dept: BARIATRICS/WEIGHT MGMT | Age: 63
End: 2018-07-10

## 2018-07-10 VITALS
DIASTOLIC BLOOD PRESSURE: 72 MMHG | WEIGHT: 216.5 LBS | SYSTOLIC BLOOD PRESSURE: 130 MMHG | HEART RATE: 68 BPM | HEIGHT: 62 IN | BODY MASS INDEX: 39.84 KG/M2

## 2018-07-10 DIAGNOSIS — E66.01 MORBID OBESITY WITH BMI OF 40.0-44.9, ADULT (HCC): Primary | ICD-10-CM

## 2018-07-10 DIAGNOSIS — Z71.3 DIETARY COUNSELING AND SURVEILLANCE: ICD-10-CM

## 2018-07-10 PROCEDURE — 99213 OFFICE O/P EST LOW 20 MIN: CPT | Performed by: FAMILY MEDICINE

## 2018-07-10 RX ORDER — MELOXICAM 15 MG/1
TABLET ORAL
COMMUNITY
Start: 2018-06-15

## 2018-07-10 ASSESSMENT — ENCOUNTER SYMPTOMS
GASTROINTESTINAL NEGATIVE: 1
EYES NEGATIVE: 1
RESPIRATORY NEGATIVE: 1

## 2018-07-10 NOTE — PROGRESS NOTES
Patient: Marlyn [facility]                      Encounter Date: 7/10/2018    YOB: 1955               Age: 65 y.o. Chief Complaint   Patient presents with    Weight Management     12th MWM, Optifast       /72   Pulse 68   Ht 5' 1.5\" (1.562 m)   Wt 216 lb 8 oz (98.2 kg)   BMI 40.24 kg/m²     Body mass index is 40.24 kg/m². HPI: 65 y.o. female with a long-standing history of obesity presents today for her 1-month follow-up. She has lost 0.5 pounds since her last visit. Current treatment includes OPTIFAST 1100-Inocencio diet and exercise. Met with the dietitian today. Food recall and assessment reviewed. Overall, following the meal plan but is struggling with late night cravings. Diet: []LCHF/Ketogenic   []Modified low-calorie/low carb diet  [x]Low-calorie diet          []Maintenance       []Other:         Adherent?  Somewhat        Side effects: No       Exercise: []Cardio     []Resistance/strength training     [x]Other: Water aerobics 2-4x/week     Allergies   Allergen Reactions    Seasonal          Current Outpatient Prescriptions:     metFORMIN (GLUCOPHAGE) 500 MG tablet, daily, Disp: , Rfl:     meloxicam (MOBIC) 15 MG tablet, , Disp: , Rfl:     letrozole (FEMARA) 2.5 MG tablet, Take 2.5 mg by mouth daily, Disp: , Rfl:     naratriptan HCl (AMERGE) 1 MG tablet, , Disp: , Rfl:     frovatriptan succinate (FROVA) 2.5 MG TABS, Take 2.5 mg by mouth as needed for Migraine, Disp: , Rfl:     Cholecalciferol (VITAMIN D PO), Take by mouth daily, Disp: , Rfl:     vitamin B-2 (RIBOFLAVIN) 100 MG TABS tablet, TK 4 TS PO QD, Disp: , Rfl: 11    amLODIPine (NORVASC) 5 MG tablet, , Disp: , Rfl:     buPROPion (WELLBUTRIN XL) 300 MG extended release tablet, Take 300 mg by mouth daily, Disp: , Rfl:     PRISTIQ 100 MG TB24 extended release tablet, Take 100 mg by mouth daily, Disp: , Rfl:     MIRANDA- MG EC tablet, , Disp: , Rfl:     MAGNESIUM-OXIDE 400 (241.3 MG) MG TABS tablet, TK 1 T PO D, normal.       Orders Only on 06/06/2018   Component Date Value Ref Range Status    TSH 06/06/2018 1.17  0.27 - 4.20 uIU/mL Final    Vitamin B-12 06/06/2018 1114* 211 - 911 pg/mL Final    Folate 06/06/2018 >20.00  4.78 - 24.20 ng/mL Final    Comment: Effective 11-15-16 10:00am EST  Please note reference ranges have  changed for Folate.  Vit D, 25-Hydroxy 06/06/2018 45.8  >=30 ng/mL Final    Comment: <=20 ng/mL. ........... Abhijeet Quiet Deficient  21-29 ng/mL. ......... Abhijeet Quiet Insufficient  >=30 ng/mL. ........ Abhijeet Quiet Sufficient      Sodium 06/06/2018 146* 136 - 145 mmol/L Final    Potassium 06/06/2018 4.6  3.5 - 5.1 mmol/L Final    Chloride 06/06/2018 104  99 - 110 mmol/L Final    CO2 06/06/2018 28  21 - 32 mmol/L Final    Anion Gap 06/06/2018 14  3 - 16 Final    Glucose 06/06/2018 147* 70 - 99 mg/dL Final    BUN 06/06/2018 19  7 - 20 mg/dL Final    CREATININE 06/06/2018 <0.5* 0.6 - 1.2 mg/dL Final    GFR Non- 06/06/2018 >60  >60 Final    Comment: >60 mL/min/1.73m2 EGFR, calc. for ages 25 and older using the  MDRD formula (not corrected for weight), is valid for stable  renal function.  GFR  06/06/2018 >60  >60 Final    Comment: Chronic Kidney Disease: less than 60 ml/min/1.73 sq.m. Kidney Failure: less than 15 ml/min/1.73 sq.m. Results valid for patients 18 years and older.       Calcium 06/06/2018 9.6  8.3 - 10.6 mg/dL Final    Total Protein 06/06/2018 6.7  6.4 - 8.2 g/dL Final    Alb 06/06/2018 4.1  3.4 - 5.0 g/dL Final    Albumin/Globulin Ratio 06/06/2018 1.6  1.1 - 2.2 Final    Total Bilirubin 06/06/2018 0.4  0.0 - 1.0 mg/dL Final    Alkaline Phosphatase 06/06/2018 88  40 - 129 U/L Final    ALT 06/06/2018 17  10 - 40 U/L Final    AST 06/06/2018 13* 15 - 37 U/L Final    Globulin 06/06/2018 2.6  g/dL Final    Hemoglobin A1C 06/06/2018 6.2  See comment % Final    Comment: Comment:  Diagnosis of Diabetes: > or = 6.5%  Increased risk of diabetes (Prediabetes): 5.7-6.4%  Glycemic Control: Nonpregnant Adults: <7.0%                    Pregnant: <6.0%        eAG 06/06/2018 131.2  mg/dL Final    Cholesterol, Total 06/06/2018 241* 0 - 199 mg/dL Final    Triglycerides 06/06/2018 158* 0 - 150 mg/dL Final    HDL 06/06/2018 71* 40 - 60 mg/dL Final    LDL Calculated 06/06/2018 138* <100 mg/dL Final    VLDL Cholesterol Calculated 06/06/2018 32  Not Established mg/dL Final    WBC 06/06/2018 6.2  4.0 - 11.0 K/uL Final    RBC 06/06/2018 4.82  4.00 - 5.20 M/uL Final    Hemoglobin 06/06/2018 14.5  12.0 - 16.0 g/dL Final    Hematocrit 06/06/2018 42.2  36.0 - 48.0 % Final    MCV 06/06/2018 87.5  80.0 - 100.0 fL Final    MCH 06/06/2018 30.0  26.0 - 34.0 pg Final    MCHC 06/06/2018 34.3  31.0 - 36.0 g/dL Final    RDW 06/06/2018 13.6  12.4 - 15.4 % Final    Platelets 32/74/9059 222  135 - 450 K/uL Final    MPV 06/06/2018 7.7  5.0 - 10.5 fL Final    Neutrophils % 06/06/2018 72.1  % Final    Lymphocytes % 06/06/2018 19.3  % Final    Monocytes % 06/06/2018 5.2  % Final    Eosinophils % 06/06/2018 2.8  % Final    Basophils % 06/06/2018 0.6  % Final    Neutrophils # 06/06/2018 4.5  1.7 - 7.7 K/uL Final    Lymphocytes # 06/06/2018 1.2  1.0 - 5.1 K/uL Final    Monocytes # 06/06/2018 0.3  0.0 - 1.3 K/uL Final    Eosinophils # 06/06/2018 0.2  0.0 - 0.6 K/uL Final    Basophils # 06/06/2018 0.0  0.0 - 0.2 K/uL Final         Assessment and Plan:    ICD-10-CM ICD-9-CM    1. Morbid obesity with BMI of 40.0-44.9, adult ([address] E66.01 278.01 Encouraged patient to follow the meal plan as prescribed. Counseled on the importance of sleep in weight management. Reinforced sleep hygiene. Increase physical activity as tolerated. Explained that losing weight may be more difficult this time around because of her recent medication changes (Letrozole). Z68.41 V85.41    2.  Dietary counseling and surveillance Z71.3 V65.3        Nutrition plan: [] LCHF/Ketogenic   [] Modified low-calorie diet

## 2018-07-10 NOTE — PATIENT INSTRUCTIONS
Patient Education        Learning About Obesity  What is obesity? Obesity means having so much body fat that your health is in danger. Having too much body fat can lead to type 2 diabetes, heart disease, high blood pressure, arthritis, sleep apnea, and stroke. Even if you don't feel bad now, think about these health risks. Do they seem like a good reason to start on a new path toward a healthier weight? Or do you have another personal, powerful reason for wanting to lose weight? Whatever it is, keep it in mind. It can be hard to change eating habits and exercise habits. But with your own reason and plan, you can do it. How do you know if your weight is in the obesity range? To know if your weight is in the obesity range, your doctor looks at your body mass index (BMI) and waist size. Your BMI is a number that is calculated from your weight and your height. To figure your BMI for yourself, get a BMI table from your doctor or use an online tool, such as http://www.vegas.com/ on the [employer]. What causes obesity? When you take in more calories than you burn off, you gain weight. How you eat, how active you are, and other things affect how your body uses calories and whether you gain weight. If you have family members who have too much body fat, you may have inherited a tendency to gain weight. And your family also helps form your eating and lifestyle habits, which can lead to obesity. Also, our busy lives make it harder to plan and cook healthy meals. For many of us, it's easier to reach for prepared foods, go out to eat, or go to the drive-through. But these foods are often high in saturated fat and calories. Portions are often too large. What can you do to reach a healthy weight? Focus on health, not diets. Diets are hard to stay on and don't work in the long run.  It is very hard to stay with a diet that includes lots of big changes in your eating habits. Instead of a diet, focus on lifestyle changes that will improve your health and achieve the right balance of energy and calories. To lose weight, you need to burn more calories than you take in. You can do it by eating healthy foods in reasonable amounts and becoming more active, even a little bit every day. Making small changes over time can add up to a lot. Make a plan for change. Many people have found that naming their reasons for change and staying focused on their plan can make a big difference. Work with your doctor to create a plan that is right for you. · Ask yourself: Moiséswei Dk are my personal, most powerful reasons for wanting this change? What will my life look like when I've made the change? \"  · Set your long-term goal. Make it specific, such as \"I will lose x pounds. \"  · Break your long-term goal into smaller, short-term goals. Make these small steps specific and within your reach, things you know you can do. These steps are what keep you going from day to day. How can you stay on your plan for change? Be ready. Choose to start during a time when there are few events that might trigger slip-ups, like holidays, social events, and high-stress periods. Decide on your first few steps. Most people have more success when they make small changes, one step at a time. For example, you might switch a daily candy bar to a piece of fruit, walk 10 minutes more, or add more vegetables to a meal.  Line up your support people. Make sure you're not going to be alone as you make this change. Connect with people who understand how important it is to you. Ask family members and friends for help in keeping with your plan. And think about who could make it harder for you, and how to handle them. Try tracking. People who keep track of what they eat, feel, and do are better at losing weight. Try writing down things like:  · What and how much you eat.   · How you feel before and after each

## 2018-07-10 NOTE — PROGRESS NOTES
Bhumi CALIXTO Cheng lost 0.5 lbs over 1 month. Feels hungry in evenings and struggling with     Current treatment plan:   Patient is not on medication. Negative side effects from medications? no  Patient is  on Optifast.   Patient is not on diet and exercise only plan. Treatment plan details: Optifast 4:1    Is patient adhering to diet/meal plan: Mostly    Carbohydrate consumption: Not tracking    Wake up around 9 or 10 AM  Breakfast: Shake + coffee w/ carbmaster milk    Lunch: 12:30-1 PM: Shake + sometimes fruit    Snack: 3 PM: Bar    Dinner: 6 PM: Salad w/ chuck/onion/rodriguez/peppers + vinegtertte + meat    Snack: 7:30-8 PM: sometimes smartpop    Snack: 9 PM: Bar  Will stay up until 2 AM    Consuming at least 64oz of calorie free fluids? 1-2 cups coffee + 12 oz diet coke + 3-4 bottles water    Participating in intentional exercise?  Yes Details: water aerobic 2 days/ week + walking daily    Plan/Goals:   - Stretch eating times to ~4 hours apart  - Avoid diet soda and increase water to 4 bottles/day  - Continue current exercise routine    Handouts: none    [employer]

## 2018-08-22 ENCOUNTER — OFFICE VISIT (OUTPATIENT)
Dept: BARIATRICS/WEIGHT MGMT | Age: 63
End: 2018-08-22

## 2018-08-22 VITALS
SYSTOLIC BLOOD PRESSURE: 132 MMHG | WEIGHT: 216 LBS | HEART RATE: 60 BPM | BODY MASS INDEX: 39.75 KG/M2 | HEIGHT: 62 IN | DIASTOLIC BLOOD PRESSURE: 84 MMHG

## 2018-08-22 DIAGNOSIS — Z71.3 DIETARY COUNSELING AND SURVEILLANCE: ICD-10-CM

## 2018-08-22 DIAGNOSIS — E66.01 MORBID OBESITY WITH BMI OF 40.0-44.9, ADULT (HCC): Primary | ICD-10-CM

## 2018-08-22 PROCEDURE — 99213 OFFICE O/P EST LOW 20 MIN: CPT | Performed by: FAMILY MEDICINE

## 2018-08-22 ASSESSMENT — ENCOUNTER SYMPTOMS
RESPIRATORY NEGATIVE: 1
EYES NEGATIVE: 1
GASTROINTESTINAL NEGATIVE: 1

## 2018-08-22 NOTE — PATIENT INSTRUCTIONS
reach a healthy weight? Focus on health, not diets. Diets are hard to stay on and don't work in the long run. It is very hard to stay with a diet that includes lots of big changes in your eating habits. Instead of a diet, focus on lifestyle changes that will improve your health and achieve the right balance of energy and calories. To lose weight, you need to burn more calories than you take in. You can do it by eating healthy foods in reasonable amounts and becoming more active, even a little bit every day. Making small changes over time can add up to a lot. Make a plan for change. Many people have found that naming their reasons for change and staying focused on their plan can make a big difference. Work with your doctor to create a plan that is right for you. · Ask yourself: Sharri Ortiz are my personal, most powerful reasons for wanting this change? What will my life look like when I've made the change? \"  · Set your long-term goal. Make it specific, such as \"I will lose x pounds. \"  · Break your long-term goal into smaller, short-term goals. Make these small steps specific and within your reach, things you know you can do. These steps are what keep you going from day to day. Talk with your doctor about other weight-loss options. If you have a BMI in a certain range and have not been able to lose weight with diet and exercise, medicine or surgery may be an option for you. Before your doctor will prescribe medicines or surgery, he or she will probably want you to be more active and follow your healthy eating plan for a period of time. These habits are key lifelong changes for managing your weight, with or without other medical treatment. And these changes can help you avoid weight-related health problems. How can you stay on your plan for change? Be ready. Choose to start during a time when there are few events that might trigger slip-ups, like holidays, social events, and high-stress periods.   Decide on your first few steps. Most people have more success when they make small changes, one step at a time. For example, you might switch a daily candy bar to a piece of fruit, walk 10 minutes more, or add more vegetables to a meal.  Line up your support people. Make sure you're not going to be alone as you make this change. Connect with people who understand how important it is to you. Ask family members and friends for help in keeping with your plan. And think about who could make it harder for you, and how to handle them. Try tracking. People who keep track of what they eat, feel, and do are better at losing weight. Try writing down things like:  · What and how much you eat. · How you feel before and after each meal.  · Details about each meal (like eating out or at home, eating alone, or with friends or family). · What you do to be active. Look and plan. As you track, look for patterns that you may want to change. Take note of:  · When you eat and whether you skip meals. · How often you eat out. · How many fruits and vegetables you eat. · When you eat beyond feeling full. · When and why you eat for reasons other than being hungry. When you stray from your plan, don't get upset. Figure out what made you slip up and how you can fix it. Can you take medicines or have surgery to lose weight? If you have a BMI in a certain range and have not been able to lose weight with diet and exercise, medicine or surgery may be an option for you. If you have a BMI of at least 30.0 (or a BMI of at least 27.0 and another health problem related to your weight), ask your doctor about weight-loss medicines. They work by making you feel less hungry, making you feel full more quickly, or changing how you digest fat. Medicines are used along with diet changes and more physical activity to help you make lasting changes.   If you have a BMI of 40.0 or more (or a BMI of 35.0 or more and another health problem related to your weight), your doctor may talk with you about surgery. Weight-loss surgery has risks, and you will need to work with your doctor to compare the risk of having obesity with the risks of surgery. With any option you choose, you will still need to eat a healthy diet and get regular exercise. Follow-up care is a key part of your treatment and safety. Be sure to make and go to all appointments, and call your doctor if you are having problems. It's also a good idea to know your test results and keep a list of the medicines you take. Where can you learn more? Go to https://[employer]. org and sign in to your [employer] account. Enter N111 in the ioSafe box to learn more about \"Learning About Obesity. \"     If you do not have an account, please click on the \"Sign Up Now\" link. Current as of: October 9, 2017  Content Version: 11.7  © 8899-2616 [employer]. Care instructions adapted under license by [facility] ([facility]). If you have questions about a medical condition or this instruction, always ask your healthcare professional. [address] any warranty or liability for your use of this information.

## 2018-08-22 NOTE — PROGRESS NOTES
Bhumi CALIXTO Cheng lost 0.5 lbs over past 6 weeks. Pt reports she does not like having to meal plan/prep and wants to eliminate her meal and use 5 products/day. Current treatment plan:   Patient is not on medication. Negative side effects from medications? N/A  Patient is  on Optifast.   Patient is not on diet and exercise only plan. Treatment plan details: Optifast 4+1     Is patient adhering to diet/meal plan?: mostly     Carbohydrate consumption: no     Breakfast: optifast shake, coffee with carbmaster milk     Snack: optifast shake    Lunch: no    Snack: optifast bar     Dinner: salad with chicken with low fat dressing with tomatoes     Snack: smart pop popcorn and cheese with optifast bar     Fluids: coffee with carbmaster milk , water, diet soda     Consuming at least 64oz of calorie free fluids? 48 oz/day     Participating in intentional exercise? No     Plan/Goals: reduce popcorn and cheese at night and switch to salad to help feel more full.  Educated pt that we do not offer optifast regimen with 5 products/day with goal to focus on meal planning/prepping to help her be more successful long term    Handouts: none     Tilmon Chamberino

## 2020-10-05 ENCOUNTER — HOSPITAL ENCOUNTER (OUTPATIENT)
Dept: GENERAL RADIOLOGY | Age: 65
Discharge: HOME | End: 2020-10-05
Payer: COMMERCIAL

## 2020-10-05 PROCEDURE — 77080 DXA BONE DENSITY AXIAL: CPT

## 2022-10-06 ENCOUNTER — HOSPITAL ENCOUNTER (OUTPATIENT)
Dept: GENERAL RADIOLOGY | Age: 67
Discharge: HOME | End: 2022-10-06
Payer: COMMERCIAL

## 2022-10-06 DIAGNOSIS — C50.411 MALIGNANT NEOPLASM OF UPPER-OUTER QUADRANT OF RIGHT FEMALE BREAST, UNSPECIFIED ESTROGEN RECEPTOR STATUS (HCC): ICD-10-CM

## 2022-10-06 PROCEDURE — 77080 DXA BONE DENSITY AXIAL: CPT
